# Patient Record
Sex: MALE | Race: BLACK OR AFRICAN AMERICAN | NOT HISPANIC OR LATINO | Employment: FULL TIME | URBAN - METROPOLITAN AREA
[De-identification: names, ages, dates, MRNs, and addresses within clinical notes are randomized per-mention and may not be internally consistent; named-entity substitution may affect disease eponyms.]

---

## 2020-02-27 NOTE — PROGRESS NOTES
Subjective     Harvey Sanchez is a 34 year old male who presents to clinic today for the following health issues:    HPI     Patient is here for semen analysis-wants to be sure nothing is wrong physically causing his erectile and ejaculation issues     Other sexual issues, feels he cannot please his partner and she is nice about it but less interested in having sex, he feels this gets in the way of their sex life and is worsening. They plan to try to get pregnant soon  No urination issues, no std hx    Anxiety since childhood, soccer couldn't play when people were watching  Hasn't tried counseling or meds other than supplements  Uses night and day herbal OTC for stress-doesn't know what     Newly , ejaculates quickly then learned to control it sometimes, then if goes a long time without sex he ejaculates quickly again  Sometimes cannot get an erection when she wants him to   Morning erections are normal    Tried otc medications to satisfy her, [facility] lubricant others [place] caused his penis to burn  Zyrexin, extenze tried but then stopped   supplements taking L-Arginine and other natural formula  Doesn't drink alcohol or do drugs    No cardiac or medical issues  No std history or concerns, neither had previous partners  No abuse, safe in current environment        There is no problem list on file for this patient.    History reviewed. No pertinent surgical history.    Social History     Tobacco Use     Smoking status: Never Smoker     Smokeless tobacco: Never Used   Substance Use Topics     Alcohol use: Never     Frequency: Never     History reviewed. No pertinent family history.      No current outpatient medications on file.     No Known Allergies  No lab results found.   BP Readings from Last 3 Encounters:   02/28/20 120/84    Wt Readings from Last 3 Encounters:   02/28/20 66.1 kg (145 lb 11.2 oz)                    Reviewed and updated as needed this visit by Provider         Review of Systems   ROS COMP:  "Constitutional, HEENT, cardiovascular, pulmonary, GI, , musculoskeletal, neuro, skin, endocrine and psych systems are negative, except as otherwise noted.      Objective    /84   Pulse 66   Temp 98.3  F (36.8  C) (Oral)   Ht 1.854 m (6' 1\")   Wt 66.1 kg (145 lb 11.2 oz)   SpO2 99%   BMI 19.22 kg/m    Body mass index is 19.22 kg/m .  Physical Exam   GENERAL: healthy, alert and no distress  EYES: Eyes grossly normal to inspection, PERRL and conjunctivae and sclerae normal  RESP: Respiratory rate regular and breathing easily   CV: No abnormal color and extremities warm    (male): deferred  MS: no gross musculoskeletal defects noted, no edema  PSYCH: mentation appears normal, affect normal/bright, anxious, judgement and insight intact and appearance well groomed    Diagnostic Test Results:  Labs reviewed in Epic        Assessment & Plan       ICD-10-CM    1. Sexual dysfunction, psychological F52.9 MENTAL HEALTH REFERRAL  - Adult; Outpatient Treatment; Individual/Couples/Family/Group Therapy/Health Psychology; [place]: [facility] [phone]; We will contact you to schedule the appointment or please call with any questions     [facility]   2. Adjustment disorder with anxious mood F43.22 MENTAL HEALTH REFERRAL  - Adult; Outpatient Treatment; Individual/Couples/Family/Group Therapy/[facility] [place] [facility]; [place]: [facility] [phone]; We will contact you to schedule the appointment or please call with any questions   ejaculation and erectile dysfunction issues discussed not likely physical issue and recommended individual and/or couples counseling, see also tips below   Did not nned semen analysis yet, referred to sexual health clinic or could do CCR[facility] for next steps    Anxiety uncontrolled since childhood, didn't seek help until now, agrees to try counseling. Wants to hold off on medications at this time    Discussed the pathophysiology of anxiety " episodes and the various symptoms seen associated with anxiety episodes.  Discussed possible triggers including fatigue, depression, stress, and chemicals such as alcohol, caffeine and certain drugs.  Discussed the treatment including an aerobic exercise program, adequate rest, and both rescue meds and maintenance meds.        Patient Instructions     Astroglide is a nice lubricant for couples    [employer] is a store where your wife can get a vibrator or something to keep her happy if you ejaculate too quickly or your anxiety is not controlled      Patient Education     Erectile Dysfunction: Rebuilding Intimacy     Man and woman sitting together on couch, smiling.     Being intimate means being close as a couple, with sex as just one part of intimacy. A hug, a kind remark, or a gift can be very romantic, even if sex doesn t follow. So renew your intimacy along with your sex life. Learn to talk with, and listen to, your partner. And remember that your value as a man goes beyond what you do in bed.  Tips for intimacy  As you and your partner become closer to each other, you might find that you can enjoy sex more.    Show and tell your partner what you like. If you don t, your partner might not know what you want.    Ask your partner to show you how he or she wants to be touched.    Be patient. Take your time. Relax. Give yourselves a chance to become aroused.    Try being intimate without intercourse. Instead, exchange back rubs. Or try kissing, or just a soft touch.    Focus on what you and your partner like about each other. This could be a certain laugh or smile, or other joys you share together.  Tips for talking  It s OK to be shy when you talk about sex with your partner. But talking gets easier with practice. Use these tips when you talk with each other.    Choose a time and place when you re both relaxed and comfortable.    Listen to your partner. Try repeating back what you think the other has said. This  will help show if you ve understood each other.    Don t  what your partner says. Talking feels safer if you don t criticize each other.    Don t be defensive. You may not like something your partner says. But you can still thank your partner for being honest.    Think about meeting with a counselor. They re trained to help couples who are being treated for ED.  Date Last Reviewed: 1/1/2017 2000-2019 [employer]. [address] [place] [address], [place] [address]. All rights reserved. This information is not intended as a substitute for professional medical care. Always follow your healthcare professional's instructions.           Patient Education     Understanding Erectile Dysfunction    Erectile dysfunction (ED) is a problem getting an erection firm enough or keeping it long enough for intercourse. The problem can happen to any man at any age. But health problems that can lead to ED become more common as a man ages. Up to half of men over age 40 experience ED at some point.  Causes of ED  ED can have many causes. Most are physical. Some are emotional issues. Often, a combination of causes is involved. Causes of ED may include:    Medical conditions such as diabetes or depression    Smoking tobacco or marijuana    Drinking too much alcohol    Side effects of medications    Injury to nerves or blood vessels    Emotional issues such as stress or relationship problems  ED can be treated  Prescription medications for ED are available. They help many men who try them. Depending upon the cause of the ED, though, medications may not be enough. In these cases, other treatment options are available. These include erectile aids and surgery. Your health care provider can tell you more about the treatment that is right for you. And new treatments for ED are being studied. No matter what the treatment you decide on, stay in touch with your doctor. If your symptoms persist, he or she may be able to adjust your  current treatment or try something new.  Date Last Reviewed: 1/1/2017 2000-2019 [employer]. [address] [facility], [place] [address]. All rights reserved. This information is not intended as a substitute for professional medical care. Always follow your healthcare professional's instructions.               Return in about 4 weeks (around 3/27/2020) for next annual exam or as needed.come fasting     SARAHI Santiago [facility]

## 2020-02-28 ENCOUNTER — TELEPHONE (OUTPATIENT)
Dept: FAMILY MEDICINE | Facility: CLINIC | Age: 35
End: 2020-02-28

## 2020-02-28 ENCOUNTER — OFFICE VISIT (OUTPATIENT)
Dept: FAMILY MEDICINE | Facility: CLINIC | Age: 35
End: 2020-02-28
Payer: COMMERCIAL

## 2020-02-28 VITALS
HEART RATE: 66 BPM | WEIGHT: 145.7 LBS | TEMPERATURE: 98.3 F | OXYGEN SATURATION: 99 % | SYSTOLIC BLOOD PRESSURE: 120 MMHG | HEIGHT: 73 IN | DIASTOLIC BLOOD PRESSURE: 84 MMHG | BODY MASS INDEX: 19.31 KG/M2

## 2020-02-28 DIAGNOSIS — F52.9 SEXUAL DYSFUNCTION, PSYCHOLOGICAL: Primary | ICD-10-CM

## 2020-02-28 DIAGNOSIS — F43.22 ADJUSTMENT DISORDER WITH ANXIOUS MOOD: ICD-10-CM

## 2020-02-28 PROCEDURE — 99203 OFFICE O/P NEW LOW 30 MIN: CPT | Performed by: NURSE PRACTITIONER

## 2020-02-28 SDOH — HEALTH STABILITY: MENTAL HEALTH: HOW OFTEN DO YOU HAVE A DRINK CONTAINING ALCOHOL?: NEVER

## 2020-02-28 ASSESSMENT — MIFFLIN-ST. JEOR: SCORE: 1659.77

## 2020-02-28 NOTE — PATIENT INSTRUCTIONS
Astroglide is a nice lubricant for couples    [employer] is a store where your wife can get a vibrator or something to keep her happy if you ejaculate too quickly or your anxiety is not controlled      Patient Education     Erectile Dysfunction: Rebuilding Intimacy     Man and woman sitting together on couch, smiling.     Being intimate means being close as a couple, with sex as just one part of intimacy. A hug, a kind remark, or a gift can be very romantic, even if sex doesn t follow. So renew your intimacy along with your sex life. Learn to talk with, and listen to, your partner. And remember that your value as a man goes beyond what you do in bed.  Tips for intimacy  As you and your partner become closer to each other, you might find that you can enjoy sex more.    Show and tell your partner what you like. If you don t, your partner might not know what you want.    Ask your partner to show you how he or she wants to be touched.    Be patient. Take your time. Relax. Give yourselves a chance to become aroused.    Try being intimate without intercourse. Instead, exchange back rubs. Or try kissing, or just a soft touch.    Focus on what you and your partner like about each other. This could be a certain laugh or smile, or other joys you share together.  Tips for talking  It s OK to be shy when you talk about sex with your partner. But talking gets easier with practice. Use these tips when you talk with each other.    Choose a time and place when you re both relaxed and comfortable.    Listen to your partner. Try repeating back what you think the other has said. This will help show if you ve understood each other.    Don t  what your partner says. Talking feels safer if you don t criticize each other.    Don t be defensive. You may not like something your partner says. But you can still thank your partner for being honest.    Think about meeting with a counselor. They re trained to help couples who are being  treated for ED.  Date Last Reviewed: 1/1/2017 2000-2019 [employer]. [address] [place] [address]. All rights reserved. This information is not intended as a substitute for professional medical care. Always follow your healthcare professional's instructions.           Patient Education     Understanding Erectile Dysfunction    Erectile dysfunction (ED) is a problem getting an erection firm enough or keeping it long enough for intercourse. The problem can happen to any man at any age. But health problems that can lead to ED become more common as a man ages. Up to half of men over age 40 experience ED at some point.  Causes of ED  ED can have many causes. Most are physical. Some are emotional issues. Often, a combination of causes is involved. Causes of ED may include:    Medical conditions such as diabetes or depression    Smoking tobacco or marijuana    Drinking too much alcohol    Side effects of medications    Injury to nerves or blood vessels    Emotional issues such as stress or relationship problems  ED can be treated  Prescription medications for ED are available. They help many men who try them. Depending upon the cause of the ED, though, medications may not be enough. In these cases, other treatment options are available. These include erectile aids and surgery. Your health care provider can tell you more about the treatment that is right for you. And new treatments for ED are being studied. No matter what the treatment you decide on, stay in touch with your doctor. If your symptoms persist, he or she may be able to adjust your current treatment or try something new.  Date Last Reviewed: 1/1/2017 2000-2019 [employer]. [address] [place] [address]. All rights reserved. This information is not intended as a substitute for professional medical care. Always follow your healthcare professional's instructions.

## 2020-02-28 NOTE — TELEPHONE ENCOUNTER
Routing to Provider.     Patient is a 35 yo male who you sent a referral for today after his office visit but the referral is incorrect. Patient needs the service below:  [email or web address]    Referral order queued.   Sveta Lopez RN on 2/28/2020 at 10:01 AM

## 2020-02-28 NOTE — TELEPHONE ENCOUNTER
Reason for call:  Other   Patient called regarding (reason for call): call back  Additional comments: Patient called and stated that the place he was referred to does not do sperm counts. Patient would like a call back.    Phone number to reach patient:  Home number on file [phone] (home)    Best Time:  na    Can we leave a detailed message on this number?  YES    Travel screening:

## 2020-03-02 NOTE — TELEPHONE ENCOUNTER
Called and spoke to patient. Patient was driving and has me call back to leave [facility] with referral phone numbers. Writer called back and left [facility] with phone numbers for referrals. Sveta Lopez RN on 3/2/2020 at 4:06 PM

## 2020-03-02 NOTE — TELEPHONE ENCOUNTER
The place I referred him to was for counseling for pt and wife regarding ED.     I did tell him he would need to go somewhere else for the sperm counts he felt wasn't needed after our discussion--the plan was to get set up with sexual health therapist and if needed infertility would be the next step.     Please have pt set up the therapy first please, but I did also sign this to get the ball rolling there as well.     Thanks  Katiana MCCLAIN CNP

## 2020-03-04 NOTE — PATIENT INSTRUCTIONS
Recommended mineral oils drops  Recommended Water 8-9 glasses a day  Follow up for eye exam and dental care.  Recommended on posture exercises, stretching and strengthening exercises.   Be cautious on lifting heavy objects.    Preventive Health Recommendations  Male Ages 26 - 39    Yearly exam:             See your health care provider every year in order to  o   Review health changes.   o   Discuss preventive care.    o   Review your medicines if your doctor has prescribed any.    You should be tested each year for STDs (sexually transmitted diseases), if you re at risk.     After age 35, talk to your provider about cholesterol testing. If you are at risk for heart disease, have your cholesterol tested at least every 5 years.     If you are at risk for diabetes, you should have a diabetes test (fasting glucose).  Shots: Get a flu shot each year. Get a tetanus shot every 10 years.     Nutrition:    Eat at least 5 servings of fruits and vegetables daily.     Eat whole-grain bread, whole-wheat pasta and brown rice instead of white grains and rice.     Get adequate Calcium and Vitamin D.     Lifestyle    Exercise for at least 150 minutes a week (30 minutes a day, 5 days a week). This will help you control your weight and prevent disease.     Limit alcohol to one drink per day.     No smoking.     Wear sunscreen to prevent skin cancer.     See your dentist every six months for an exam and cleaning.

## 2020-03-04 NOTE — PROGRESS NOTES
3  SUBJECTIVE:   CC: Harvey Sanchez is an 34 year old male who presents for preventive health visit.     Healthy Habits:    Do you get at least three servings of calcium containing foods daily (dairy, green leafy vegetables, etc.)? yes    Amount of exercise or daily activities, outside of work: none    Problems taking medications regularly not applicable    Medication side effects: No    Have you had an eye exam in the past two years? no    Do you see a dentist twice per year? no    Do you have sleep apnea, excessive snoring or daytime drowsiness?no    Low back pain  He presents with low back pain in the middle. He believes it might be work related, which is at [employer] that requires some lifting. The pain has been intermittent for few years. He denies any additional joint and muscle pains, no history of injuries or surgeries. He used Icy Hot, which is somewhat helpful and the pain is tolerable. He denies troubles urinating, numbness, and tingling. He is not interested in physical therapy at this time.       We received the request for sperm count evaluation, but he says it is not needed. They are doing counseling for ED first, before persuing infertility clinic.   Monogamous but would like std testing     Diet  He sleeps well and hardly eats fruits and vegetables. He is currently taking vitamin C, D, and calcium. When he stops taking vitamin D, he gets fatigued. He loses weight very quickly and denies changes in his appetite. He denies family history of rapid weight loss.    Mood  He has occasional lower mood. The patient is given a depression/anxiety form.    Additional Notes  He has done a flu shot in September in [place].   He has benign growth on his eye. He has not followed up with ophthalmology recently.    PHQ 3/6/2020   PHQ-9 Total Score 6   Q9: Thoughts of better off dead/self-harm past 2 weeks Not at all     GIL-7 SCORE 3/6/2020   Total Score 13         Today's PHQ-2 Score:   PHQ-2 ( 1999 Pf[employer])  3/6/2020 2/28/2020   Q1: Little interest or pleasure in doing things 0 0   Q2: Feeling down, depressed or hopeless 2 0   PHQ-2 Score 2 0       Abuse: Current or Past(Physical, Sexual or Emotional)- No  Do you feel safe in your environment? Yes        Social History     Tobacco Use     Smoking status: Never Smoker     Smokeless tobacco: Never Used   Substance Use Topics     Alcohol use: Never     Frequency: Never     If you drink alcohol do you typically have >3 drinks per day or >7 drinks per week? No                      Last PSA: No results found for: PSA    Reviewed orders with patient. Reviewed health maintenance and updated orders accordingly - Yes  Lab work is in process  Labs reviewed in EPIC  BP Readings from Last 3 Encounters:   03/06/20 130/80   02/28/20 120/84    Wt Readings from Last 3 Encounters:   03/06/20 63.3 kg (139 lb 9 oz)   02/28/20 66.1 kg (145 lb 11.2 oz)                  There is no problem list on file for this patient.    History reviewed. No pertinent surgical history.    Social History     Tobacco Use     Smoking status: Never Smoker     Smokeless tobacco: Never Used   Substance Use Topics     Alcohol use: Never     Frequency: Never     History reviewed. No pertinent family history.      No current outpatient medications on file.     Allergies   Allergen Reactions     Peanut Butter Flavor Hives       Reviewed and updated as needed this visit by clinical staff  Tobacco  Allergies  Meds  Med Hx  Surg Hx  Fam Hx  Soc Hx        Reviewed and updated as needed this visit by Provider        History reviewed. No pertinent past medical history.   History reviewed. No pertinent surgical history.    ROS:  Constitutional, eye, ENT, skin, breast, respiratory, cardiac, GI, , MSK, neuro, psych, and allergy are normal except as otherwise noted.     This document serves as a record of the services and decisions personally performed and made by Katiana Loving CNP. It was created on her behalf by  Arpita Rodriguez, a trained medical scribe. The creation of this document is based on the provider's statements to the medical scribe.  Arpita Rodriguez 8:14 AM 3/6/2020    OBJECTIVE:   /80   Pulse 55   Temp 97.7  F (36.5  C) (Oral)   Wt 63.3 kg (139 lb 9 oz)   SpO2 98%   BMI 18.41 kg/m    EXAM:  GENERAL: healthy, alert and no distress  EYES: small Pterygium, medial left eye, right eyes grossly normal to inspection, PERRL and conjunctivae   HENT: ear canals and TM's normal, nose and mouth without ulcers or lesions, cerumen in right ear, not impacted   NECK: no adenopathy, no asymmetry, masses, or scars and thyroid normal to palpation  RESP: lungs clear to auscultation - no rales, rhonchi or wheezes  CV: regular rate and rhythm, normal S1 S2, no S3 or S4, no murmur, click or rub, no peripheral edema and peripheral pulses strong  ABDOMEN: soft, nontender, no hepatosplenomegaly, no masses and bowel sounds normal  MS: no gross musculoskeletal defects noted, no edema  SKIN: no suspicious lesions or rashes  NEURO: Normal strength and tone, mentation intact and speech normal  PSYCH: mentation appears normal, affect normal/bright  BACK: mild scoliosis lower thoracic region, ROM normal, some pain with extension and tenderness L3-L4, slight tenderness over paralumbar muscles     Diagnostic Test Results:  Labs reviewed in Epic  Results for orders placed or performed in visit on 03/06/20 (from the past 24 hour(s))   CBC with platelets differential   Result Value Ref Range    WBC 3.7 (L) 4.0 - 11.0 10e9/L    RBC Count 6.16 (H) 4.4 - 5.9 10e12/L    Hemoglobin 16.1 13.3 - 17.7 g/dL    Hematocrit 47.8 40.0 - 53.0 %    MCV 78 78 - 100 fl    MCH 26.1 (L) 26.5 - 33.0 pg    MCHC 33.7 31.5 - 36.5 g/dL    RDW 15.7 (H) 10.0 - 15.0 %    Platelet Count 245 150 - 450 10e9/L    Diff Method Automated Method     % Neutrophils 40.0 %    % Lymphocytes 45.8 %    % Monocytes 10.1 %    % Eosinophils 3.6 %    % Basophils 0.5 %    Absolute  Neutrophil 1.5 (L) 1.6 - 8.3 10e9/L    Absolute Lymphocytes 1.7 0.8 - 5.3 10e9/L    Absolute Monocytes 0.4 0.0 - 1.3 10e9/L    Absolute Eosinophils 0.1 0.0 - 0.7 10e9/L    Absolute Basophils 0.0 0.0 - 0.2 10e9/L       ASSESSMENT/PLAN:       ICD-10-CM    1. Routine general medical examination at a health care facility Z00.00    2. Vitamin D deficiency E55.9 Vitamin D Deficiency   3. Fatigue, unspecified type R53.83 Lipid panel reflex to direct LDL Fasting     Comprehensive metabolic panel     TSH with free T4 reflex     CBC with platelets differential     Vitamin D Deficiency     OFFICE/OUTPT VISIT,EST,LEVL IV   4. Weight loss R63.4 Prealbumin     OFFICE/OUTPT VISIT,EST,LEVL IV   5. Screen for STD (sexually transmitted disease) Z11.3 NEISSERIA GONORRHOEA PCR     CHLAMYDIA TRACHOMATIS PCR     HIV Antigen Antibody Combo     Treponema Abs w Reflex to RPR and Titer   6. GIL (generalized anxiety disorder) F41.1 OFFICE/OUTPT VISIT,EST,LEVL IV   7. Mild major depression (H) F32.0 OFFICE/OUTPT VISIT,EST,LEVL IV   8. Mild scoliosis M41.9 OFFICE/OUTPT VISIT,EST,LEVL IV   9. Midline low back pain without sciatica, unspecified chronicity M54.5 OFFICE/OUTPT VISIT,EST,LEVL IV   mild fatigue at times and wt loss we'll work up more, pt reports eating poorly and minimal exercise likely contributory. Will do lab work today to rule out anemia, thyroid issues, electrolyte imbalances and Vit D deficiency and will follow up as indicated.      ED plans to do counseling with wife and if not improving with this they are going to pursue infertility clinic in the future --declines referral at this time    Moderate GIL and mild depression, no SI, is affecting his life and likely related to ED above. He will start counseling and consider medications if not improving with this Return to Clinic     Recommended follow up visit to go over lab results or if all normal can return in 1-2 months for mood check     COUNSELING:  Reviewed preventive health  "counseling, as reflected in patient instructions  Special attention given to:        Regular exercise       Healthy diet/nutrition       Vision screening       Immunizations - patient plans to bring updated immunization records.       Safe sex practices/STD prevention    Estimated body mass index is 18.41 kg/m  as calculated from the following:    Height as of 2/28/20: 1.854 m (6' 1\").    Weight as of this encounter: 63.3 kg (139 lb 9 oz).         reports that he has never smoked. He has never used smokeless tobacco.      Counseling Resources:  ATP IV Guidelines  Pooled Cohorts Equation Calculator  FRAX Risk Assessment  ICSI Preventive Guidelines  Dietary Guidelines for Americans, 2010  USDA's MyPlate  ASA Prophylaxis  Lung CA Screening    The information in this document, created by the medical scribe, Arpita Rodriguez, for me, accurately reflects the services I personally performed and the decisions made by me. I have reviewed and approved this document for accuracy prior to leaving the patient care area.    SARAHI Santiago [facility]  "

## 2020-03-06 ENCOUNTER — OFFICE VISIT (OUTPATIENT)
Dept: FAMILY MEDICINE | Facility: CLINIC | Age: 35
End: 2020-03-06
Payer: COMMERCIAL

## 2020-03-06 VITALS
DIASTOLIC BLOOD PRESSURE: 80 MMHG | BODY MASS INDEX: 18.41 KG/M2 | TEMPERATURE: 97.7 F | OXYGEN SATURATION: 98 % | WEIGHT: 139.56 LBS | SYSTOLIC BLOOD PRESSURE: 130 MMHG | HEART RATE: 55 BPM

## 2020-03-06 DIAGNOSIS — R53.83 FATIGUE, UNSPECIFIED TYPE: ICD-10-CM

## 2020-03-06 DIAGNOSIS — M54.50 MIDLINE LOW BACK PAIN WITHOUT SCIATICA, UNSPECIFIED CHRONICITY: ICD-10-CM

## 2020-03-06 DIAGNOSIS — Z00.00 ROUTINE GENERAL MEDICAL EXAMINATION AT A HEALTH CARE FACILITY: Primary | ICD-10-CM

## 2020-03-06 DIAGNOSIS — E55.9 VITAMIN D DEFICIENCY: ICD-10-CM

## 2020-03-06 DIAGNOSIS — F32.0 MILD MAJOR DEPRESSION (H): ICD-10-CM

## 2020-03-06 DIAGNOSIS — Z11.3 SCREEN FOR STD (SEXUALLY TRANSMITTED DISEASE): ICD-10-CM

## 2020-03-06 DIAGNOSIS — F41.1 GAD (GENERALIZED ANXIETY DISORDER): ICD-10-CM

## 2020-03-06 DIAGNOSIS — M41.9 MILD SCOLIOSIS: ICD-10-CM

## 2020-03-06 DIAGNOSIS — R63.4 WEIGHT LOSS: ICD-10-CM

## 2020-03-06 LAB
ALBUMIN SERPL-MCNC: 4 G/DL (ref 3.4–5)
ALP SERPL-CCNC: 60 U/L (ref 40–150)
ALT SERPL W P-5'-P-CCNC: 32 U/L (ref 0–70)
ANION GAP SERPL CALCULATED.3IONS-SCNC: 6 MMOL/L (ref 3–14)
AST SERPL W P-5'-P-CCNC: 18 U/L (ref 0–45)
BASOPHILS # BLD AUTO: 0 10E9/L (ref 0–0.2)
BASOPHILS NFR BLD AUTO: 0.5 %
BILIRUB SERPL-MCNC: 0.5 MG/DL (ref 0.2–1.3)
BUN SERPL-MCNC: 9 MG/DL (ref 7–30)
CALCIUM SERPL-MCNC: 9.4 MG/DL (ref 8.5–10.1)
CHLORIDE SERPL-SCNC: 103 MMOL/L (ref 94–109)
CHOLEST SERPL-MCNC: 213 MG/DL
CO2 SERPL-SCNC: 28 MMOL/L (ref 20–32)
CREAT SERPL-MCNC: 0.93 MG/DL (ref 0.66–1.25)
DIFFERENTIAL METHOD BLD: ABNORMAL
EOSINOPHIL # BLD AUTO: 0.1 10E9/L (ref 0–0.7)
EOSINOPHIL NFR BLD AUTO: 3.6 %
ERYTHROCYTE [DISTWIDTH] IN BLOOD BY AUTOMATED COUNT: 15.7 % (ref 10–15)
GFR SERPL CREATININE-BSD FRML MDRD: >90 ML/MIN/{1.73_M2}
GLUCOSE SERPL-MCNC: 89 MG/DL (ref 70–99)
HCT VFR BLD AUTO: 47.8 % (ref 40–53)
HDLC SERPL-MCNC: 73 MG/DL
HGB BLD-MCNC: 16.1 G/DL (ref 13.3–17.7)
LDLC SERPL CALC-MCNC: 127 MG/DL
LYMPHOCYTES # BLD AUTO: 1.7 10E9/L (ref 0.8–5.3)
LYMPHOCYTES NFR BLD AUTO: 45.8 %
MCH RBC QN AUTO: 26.1 PG (ref 26.5–33)
MCHC RBC AUTO-ENTMCNC: 33.7 G/DL (ref 31.5–36.5)
MCV RBC AUTO: 78 FL (ref 78–100)
MONOCYTES # BLD AUTO: 0.4 10E9/L (ref 0–1.3)
MONOCYTES NFR BLD AUTO: 10.1 %
NEUTROPHILS # BLD AUTO: 1.5 10E9/L (ref 1.6–8.3)
NEUTROPHILS NFR BLD AUTO: 40 %
NONHDLC SERPL-MCNC: 140 MG/DL
PLATELET # BLD AUTO: 245 10E9/L (ref 150–450)
POTASSIUM SERPL-SCNC: 3.8 MMOL/L (ref 3.4–5.3)
PROT SERPL-MCNC: 8.4 G/DL (ref 6.8–8.8)
RBC # BLD AUTO: 6.16 10E12/L (ref 4.4–5.9)
SODIUM SERPL-SCNC: 137 MMOL/L (ref 133–144)
TRIGL SERPL-MCNC: 63 MG/DL
TSH SERPL DL<=0.005 MIU/L-ACNC: 1.36 MU/L (ref 0.4–4)
WBC # BLD AUTO: 3.7 10E9/L (ref 4–11)

## 2020-03-06 PROCEDURE — 84134 ASSAY OF PREALBUMIN: CPT | Performed by: NURSE PRACTITIONER

## 2020-03-06 PROCEDURE — 36415 COLL VENOUS BLD VENIPUNCTURE: CPT | Performed by: NURSE PRACTITIONER

## 2020-03-06 PROCEDURE — 99213 OFFICE O/P EST LOW 20 MIN: CPT | Mod: 25 | Performed by: NURSE PRACTITIONER

## 2020-03-06 PROCEDURE — 87389 HIV-1 AG W/HIV-1&-2 AB AG IA: CPT | Performed by: NURSE PRACTITIONER

## 2020-03-06 PROCEDURE — 99395 PREV VISIT EST AGE 18-39: CPT | Performed by: NURSE PRACTITIONER

## 2020-03-06 PROCEDURE — 86780 TREPONEMA PALLIDUM: CPT | Performed by: NURSE PRACTITIONER

## 2020-03-06 PROCEDURE — 82306 VITAMIN D 25 HYDROXY: CPT | Performed by: NURSE PRACTITIONER

## 2020-03-06 PROCEDURE — 87591 N.GONORRHOEAE DNA AMP PROB: CPT | Performed by: NURSE PRACTITIONER

## 2020-03-06 PROCEDURE — 80050 GENERAL HEALTH PANEL: CPT | Performed by: NURSE PRACTITIONER

## 2020-03-06 PROCEDURE — 80061 LIPID PANEL: CPT | Performed by: NURSE PRACTITIONER

## 2020-03-06 PROCEDURE — 87491 CHLMYD TRACH DNA AMP PROBE: CPT | Performed by: NURSE PRACTITIONER

## 2020-03-06 PROCEDURE — 96127 BRIEF EMOTIONAL/BEHAV ASSMT: CPT | Performed by: NURSE PRACTITIONER

## 2020-03-06 ASSESSMENT — PATIENT HEALTH QUESTIONNAIRE - PHQ9
SUM OF ALL RESPONSES TO PHQ QUESTIONS 1-9: 6
5. POOR APPETITE OR OVEREATING: NEARLY EVERY DAY

## 2020-03-06 ASSESSMENT — ANXIETY QUESTIONNAIRES
7. FEELING AFRAID AS IF SOMETHING AWFUL MIGHT HAPPEN: SEVERAL DAYS
5. BEING SO RESTLESS THAT IT IS HARD TO SIT STILL: SEVERAL DAYS
6. BECOMING EASILY ANNOYED OR IRRITABLE: NOT AT ALL
1. FEELING NERVOUS, ANXIOUS, OR ON EDGE: NEARLY EVERY DAY
IF YOU CHECKED OFF ANY PROBLEMS ON THIS QUESTIONNAIRE, HOW DIFFICULT HAVE THESE PROBLEMS MADE IT FOR YOU TO DO YOUR WORK, TAKE CARE OF THINGS AT HOME, OR GET ALONG WITH OTHER PEOPLE: SOMEWHAT DIFFICULT
GAD7 TOTAL SCORE: 13
2. NOT BEING ABLE TO STOP OR CONTROL WORRYING: MORE THAN HALF THE DAYS
3. WORRYING TOO MUCH ABOUT DIFFERENT THINGS: NEARLY EVERY DAY

## 2020-03-07 LAB
DEPRECATED CALCIDIOL+CALCIFEROL SERPL-MC: 35 UG/L (ref 20–75)
HIV 1+2 AB+HIV1 P24 AG SERPL QL IA: NONREACTIVE
T PALLIDUM AB SER QL: NONREACTIVE

## 2020-03-07 ASSESSMENT — ANXIETY QUESTIONNAIRES: GAD7 TOTAL SCORE: 13

## 2020-03-08 LAB
C TRACH DNA SPEC QL NAA+PROBE: NEGATIVE
N GONORRHOEA DNA SPEC QL NAA+PROBE: NEGATIVE
SPECIMEN SOURCE: NORMAL
SPECIMEN SOURCE: NORMAL

## 2020-03-09 LAB — PREALB SERPL IA-MCNC: 30 MG/DL (ref 15–45)

## 2020-03-13 ENCOUNTER — TELEPHONE (OUTPATIENT)
Dept: FAMILY MEDICINE | Facility: CLINIC | Age: 35
End: 2020-03-13

## 2020-03-13 DIAGNOSIS — R63.6 UNDERWEIGHT: Primary | ICD-10-CM

## 2020-03-13 NOTE — TELEPHONE ENCOUNTER
Lab results discussed with pt    Pt wants to know if he should take an iron supplement. He is concerned that he is losing weight so easily and he is very concerned about this. This is every since childhood. None of his family has this problem.       He will check if his insurance pays for RD and if it does he will go see dietician.  Katiana, please sign referral and advise on iron supplement.    Referral pended  .   Meagan Liu, RN, BSN

## 2020-03-17 ENCOUNTER — VIRTUAL VISIT (OUTPATIENT)
Dept: FAMILY MEDICINE | Facility: OTHER | Age: 35
End: 2020-03-17

## 2020-03-17 NOTE — PROGRESS NOTES
"Date: 2020 20:45:57  Clinician: Akosua Dixon  Clinician NPI: [id number]  Patient: Harvey Sanchez  Patient : 1985  Patient Address: [address] [place] [address]  Patient Phone: [phone]  Visit Protocol: URI  Patient Summary:  Harvey is a 34 year old ( : 1985 ) male who initiated a Visit for COVID-19 (Coronavirus) evaluation and screening. When asked the question \"Please sign me up to receive news, health information and promotions. \", Harvey responded \"Yes\".    Harvey states his symptoms started 1-2 days ago.   His symptoms consist of malaise and rhinitis. Harvey also feels feverish.   Symptom details     Nasal secretions: The color of his mucus is clear.    Temperature: His current temperature is 98.4 degrees Fahrenheit.      Harvey denies having ear pain, headache, facial pain or pressure, myalgias, wheezing, sore throat, cough, nasal congestion, teeth pain, and chills. He also denies having recent facial or sinus surgery in the past 60 days and taking antibiotic medication for the symptoms. He is not experiencing dyspnea.    Pertinent COVID-19 (Coronavirus) information  Harvey has not traveled internationally or to the areas where COVID-19 (Coronavirus) is widespread in the last 14 days before the start of his symptoms.   Harvey has not had close contact with a suspected or laboratory-confirmed COVID-19 patient within 14 days of symptom onset.   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   Pertinent medical history  Harvey needs a return to work/school note.   Weight: 145 lbs   Harvey does not smoke or use smokeless tobacco.   Weight: 145 lbs    MEDICATIONS: Cough-Sore Throat Night oral, Tylenol Extra Strength oral, Vicks DayQuil-NyQuil oral, Theraflu Flu-Sore Throat oral, ALLERGIES: NKDA  Clinician Response:  Dear Harvey,  I am sorry you are not feeling well. Your health is our priority. Based on the information you have provided, it is possible that " you may have some type of viral infection.  Please read the full treatment plan and see my recommendations below.  Medication information  Because you have a viral infection, antibiotics will not help you get better. Treating a viral infection with antibiotics could actually make you feel worse.  Self care  The following tips will keep you as comfortable as possible while you recover:     Rest    Drink plenty of water and other liquids    Take a hot shower to loosen congestion     Additional treatment plan   Based on the information you have provided, it is recommended that you go to one of our designated Coronavirus (Covid-19) testing centers to get a test done from your car.  We are offering testing from your car in [facility], [place], [facility] and [place].   To schedule a visit at [facility] or [place], please call [phone] to find out when the next available testing window is. Testing will be done in 1 hour blocks so that you can wait at home until we are available to more quickly perform your testing from the car.   To complete testing in [place] ONLY, follow the instructions below:    Go as soon as possible during the hours noted to [facility] [address], [place] [address]. Hours: M-F 7:30am-5pm    What to expect:   When you arrive please come park in the parking lot.   Be prepared to present photo ID   Call [phone] and let them know you have arrived.    They will ask for information to get you registered for a visit and will ask for the description of your car and where you are parked.    They will add you to the queue to get your test (you will stay in your car the entire time).   You will then be met by a provider who will perform a brief assessment in your car and collect samples to test for coronavirus and possibly influenza or RSV.    Isolate  yourself while traveling.  Do Not allow any visitors within 6 feet.  Do Not go to work or school.  Do Not go to [employer],  centers, shopping, or other public places.  Do Not shake hands.  Avoid close contact with others (hugging, kissing).Protect Others:     Cover Your Mouth and Nose with a mask, disposable tissue or wash cloth to avoid spreading germs to others.  Wash your hands and face frequently with soap and water   Fever Medicines:    For fever relief, take acetaminophen or ibuprofen.  Treat fevers above 101deg F (38.3deg C) to lower fevers and make you more comfortable.   Acetaminophen (e.g., Tylenol): Take 650 mg (two 325 mg pills) by mouth every 4-6 hours as needed of regular strength Tylenol or 1,000 mg (two 500 mg pills) every 8 hours as needed of Extra Strength Tylenol.   Ibuprofen (e.g., Motrin, Advil): Take 400 mg (two 200 mg pills) by mouth every 6 hours as needed.   Acetaminophen is thought to be safer than ibuprofen or naproxen for people over 65 years old. Acetaminophen is in many OTC and prescription medicines. It might be in more than one medicine that you are taking. You need to be careful and not take an overdose. Before taking any medicine, read all the instructions on the package.  Caution -NSAIDs (e.g., ibuprofen, naproxen): Do not take nonsteroidal anti-inflammatory drugs (NSAIDs) if you have stomach problems, kidney disease, heart failure, or other contraindications to using this type of medicine. Do not take NSAID medicines for over 7 days without consulting your PCP. Do not take NSAID medicines if you are pregnant. Do not take NSAID medicines if you are also taking blood thinners.    Call or submit a new visit if: Breathing difficulty develops or you become worse.  Thank you for limiting contact with others, wearing a simple mask to cover your cough, practice good hand hygiene habits and accessing our virtual services where possible to limit the spread of this virus.  For more  information about COVID19 and options for caring for yourself at home, please visit the CDC website at https://www.cdc.gov/coronavirus/2019-ncov/about/steps-when-sick.html  For more options for care at [facility], please visit our website at [email or web address]    Diagnosis: Other malaise  Diagnosis ICD: R53.81

## 2020-03-18 NOTE — TELEPHONE ENCOUNTER
Detailed message given     [facility]: [facility] (on call location)  - [place] [phone]     Rachel Morfin RN   [facility]

## 2021-01-04 ENCOUNTER — HEALTH MAINTENANCE LETTER (OUTPATIENT)
Age: 36
End: 2021-01-04

## 2021-04-25 ENCOUNTER — HEALTH MAINTENANCE LETTER (OUTPATIENT)
Age: 36
End: 2021-04-25

## 2021-10-10 ENCOUNTER — HEALTH MAINTENANCE LETTER (OUTPATIENT)
Age: 36
End: 2021-10-10

## 2021-10-19 PROBLEM — F32.9 MAJOR DEPRESSION: Status: ACTIVE | Noted: 2020-03-06

## 2022-04-14 ENCOUNTER — LAB REQUISITION (OUTPATIENT)
Dept: LAB | Facility: CLINIC | Age: 37
End: 2022-04-14

## 2022-04-14 LAB
HBV SURFACE AB SERPL IA-ACNC: 12.92 M[IU]/ML
HBV SURFACE AG SERPL QL IA: NONREACTIVE
MEV IGG SER IA-ACNC: >300 AU/ML
MEV IGG SER IA-ACNC: POSITIVE
MUMPS ANTIBODY IGG INSTRUMENT VALUE: <5 AU/ML
MUV IGG SER QL IA: NORMAL
RUBV IGG SERPL QL IA: 6.28 INDEX
RUBV IGG SERPL QL IA: POSITIVE
VZV IGG SER QL IA: 1890 INDEX
VZV IGG SER QL IA: POSITIVE

## 2022-04-14 PROCEDURE — 86787 VARICELLA-ZOSTER ANTIBODY: CPT | Performed by: INTERNAL MEDICINE

## 2022-04-14 PROCEDURE — 86765 RUBEOLA ANTIBODY: CPT | Performed by: INTERNAL MEDICINE

## 2022-04-14 PROCEDURE — 86481 TB AG RESPONSE T-CELL SUSP: CPT | Performed by: INTERNAL MEDICINE

## 2022-04-14 PROCEDURE — 86706 HEP B SURFACE ANTIBODY: CPT | Performed by: INTERNAL MEDICINE

## 2022-04-14 PROCEDURE — 86735 MUMPS ANTIBODY: CPT | Performed by: INTERNAL MEDICINE

## 2022-04-14 PROCEDURE — 87340 HEPATITIS B SURFACE AG IA: CPT | Performed by: INTERNAL MEDICINE

## 2022-04-14 PROCEDURE — 86762 RUBELLA ANTIBODY: CPT | Performed by: INTERNAL MEDICINE

## 2022-04-15 LAB
GAMMA INTERFERON BACKGROUND BLD IA-ACNC: 0.04 IU/ML
M TB IFN-G BLD-IMP: POSITIVE
M TB IFN-G CD4+ BCKGRND COR BLD-ACNC: 9.96 IU/ML
MITOGEN IGNF BCKGRD COR BLD-ACNC: 0.29 IU/ML
MITOGEN IGNF BCKGRD COR BLD-ACNC: 0.53 IU/ML
QUANTIFERON MITOGEN: 10 IU/ML
QUANTIFERON NIL TUBE: 0.04 IU/ML
QUANTIFERON TB1 TUBE: 0.57 IU/ML
QUANTIFERON TB2 TUBE: 0.33

## 2022-04-22 ENCOUNTER — ANCILLARY PROCEDURE (OUTPATIENT)
Dept: GENERAL RADIOLOGY | Facility: CLINIC | Age: 37
End: 2022-04-22
Attending: INTERNAL MEDICINE
Payer: COMMERCIAL

## 2022-04-22 DIAGNOSIS — R76.12 POSITIVE QUANTIFERON-TB GOLD TEST: ICD-10-CM

## 2022-04-22 PROCEDURE — 99207 XR CHEST 1 VIEW, EMPLOYEE HEALTH: CPT | Performed by: RADIOLOGY

## 2022-09-18 ENCOUNTER — HEALTH MAINTENANCE LETTER (OUTPATIENT)
Age: 37
End: 2022-09-18

## 2023-01-29 ENCOUNTER — HEALTH MAINTENANCE LETTER (OUTPATIENT)
Age: 38
End: 2023-01-29

## 2024-07-14 ENCOUNTER — HEALTH MAINTENANCE LETTER (OUTPATIENT)
Age: 39
End: 2024-07-14